# Patient Record
Sex: FEMALE | Race: WHITE | Employment: FULL TIME | URBAN - METROPOLITAN AREA
[De-identification: names, ages, dates, MRNs, and addresses within clinical notes are randomized per-mention and may not be internally consistent; named-entity substitution may affect disease eponyms.]

---

## 2017-06-23 ENCOUNTER — HOSPITAL ENCOUNTER (OUTPATIENT)
Dept: MAMMOGRAPHY | Age: 62
Discharge: HOME | End: 2017-06-23
Attending: INTERNAL MEDICINE
Payer: COMMERCIAL

## 2017-06-23 DIAGNOSIS — Z12.31 VISIT FOR SCREENING MAMMOGRAM: ICD-10-CM

## 2017-06-23 PROCEDURE — 77067 SCR MAMMO BI INCL CAD: CPT

## 2017-09-08 ENCOUNTER — HOSPITAL ENCOUNTER (OUTPATIENT)
Dept: CARDIAC REHAB | Age: 62
Discharge: HOME | End: 2017-09-08
Payer: COMMERCIAL

## 2017-09-08 VITALS — WEIGHT: 186 LBS | BODY MASS INDEX: 34.23 KG/M2 | HEIGHT: 62 IN

## 2017-09-08 DIAGNOSIS — J44.9 CHRONIC OBSTRUCTIVE PULMONARY DISEASE, UNSPECIFIED COPD TYPE (HCC): ICD-10-CM

## 2017-09-08 PROCEDURE — 93798 PHYS/QHP OP CAR RHAB W/ECG: CPT

## 2017-09-08 PROCEDURE — G0424 PULMONARY REHAB W EXER: HCPCS

## 2017-09-08 RX ORDER — PRIMIDONE 50 MG/1
50 TABLET ORAL 3 TIMES DAILY
COMMUNITY

## 2017-09-08 RX ORDER — ALBUTEROL SULFATE 90 UG/1
1 AEROSOL, METERED RESPIRATORY (INHALATION)
COMMUNITY

## 2017-09-08 RX ORDER — BUDESONIDE AND FORMOTEROL FUMARATE DIHYDRATE 160; 4.5 UG/1; UG/1
2 AEROSOL RESPIRATORY (INHALATION) 2 TIMES DAILY
COMMUNITY

## 2017-09-08 RX ORDER — PROPRANOLOL HYDROCHLORIDE 160 MG/1
160 CAPSULE, EXTENDED RELEASE ORAL DAILY
COMMUNITY

## 2017-09-08 RX ORDER — ESTRADIOL 1 MG/1
1 TABLET ORAL 2 TIMES DAILY
COMMUNITY

## 2017-09-08 NOTE — CARDIO/PULMONARY
Pulmonary Rehab Intake Note  Met with Ms. Josefa Cornejo for her initial session. Ms. Jose Fischer is a 63year old patient of Dr. Mumtaz Cuevas and Dr. Kiara Valencia who presents to rehab for pulmonary conditioning and strengthening. She had a HTN and an essential tremor. She has no known allergies. Her immunizations are up to date for flu and pneumonia. She is a former smoker, having quit in 2008. Psychosocial:  Pt scored a 9 on PHQ9. She denies any depression or anxiety. She has a supportive . She states she has stress on occasion due to son's finances, otherwise denies. She works full time from home for the [employer] to [employer]. Lungs were clear. She denied cough. No BLE edema. Pt currently does no exercise at home. Limitations are primarily related to deconditioning. Her six minute walk was done on room air and she walked 269 meters without stopping. High HR was 100 and low O2 saturation was 90%. RPE 11 and RPD 0. Patient viewed the pulmonary rehab DVD and she was given an education notebook. Resting vital signs were /80 in the right arm and 122/82 in the left arm. Heart monitor showed a NSR. Oxygen saturation on room air was 98%. BMI 34.65. Patient's identified goals are:   1. To complete all 36 sessions of Pulm Rehab.   2. To begin home walking program starting at 5 min 2-3 x daily and increase as tolerated. 3. To continue counting carbs and using portion control for weight loss.

## 2017-09-08 NOTE — CARDIO/PULMONARY
COPD Assessment Tool (CAT)     0-5           I never cough/I cough all the time       Score: 1      I have no phlegm in my chest/ My chest is completely full of phlegm  Score: 1      My chest does not feel tight at all/ My chest feels very tight   Score: 2      When I walk up a hill or stairs I am bot breathless/   When I walk up a hill or stairs I am very breathless    Score: 3      I am not limited doing any activities at home/   I am very limited doing activities at home     Score: 2      I am confident leaving my home despite my lung condition/  I am not at all confident leaving my home because of my lung condition Score: 0      I sleep soundly/ I don't sleep because of my lung condition   Score: 2      I have lots of energy/ I have no energy at all     Score: 3                   Total: 14

## 2017-09-12 ENCOUNTER — HOSPITAL ENCOUNTER (OUTPATIENT)
Dept: CARDIAC REHAB | Age: 62
Discharge: HOME | End: 2017-09-12
Payer: COMMERCIAL

## 2017-09-12 VITALS — WEIGHT: 188 LBS | BODY MASS INDEX: 34.95 KG/M2

## 2017-09-12 PROCEDURE — G0424 PULMONARY REHAB W EXER: HCPCS

## 2017-09-14 ENCOUNTER — HOSPITAL ENCOUNTER (OUTPATIENT)
Dept: CARDIAC REHAB | Age: 62
Discharge: HOME | End: 2017-09-14
Payer: COMMERCIAL

## 2017-09-14 VITALS — WEIGHT: 187.6 LBS | BODY MASS INDEX: 34.87 KG/M2

## 2017-09-14 PROCEDURE — G0424 PULMONARY REHAB W EXER: HCPCS

## 2017-09-19 ENCOUNTER — HOSPITAL ENCOUNTER (OUTPATIENT)
Dept: CARDIAC REHAB | Age: 62
Discharge: HOME | End: 2017-09-19
Payer: COMMERCIAL

## 2017-09-19 VITALS — WEIGHT: 191.2 LBS | BODY MASS INDEX: 35.54 KG/M2

## 2017-09-19 PROCEDURE — G0424 PULMONARY REHAB W EXER: HCPCS

## 2017-09-21 ENCOUNTER — HOSPITAL ENCOUNTER (OUTPATIENT)
Dept: CARDIAC REHAB | Age: 62
Discharge: HOME | End: 2017-09-21
Payer: COMMERCIAL

## 2017-09-21 VITALS — BODY MASS INDEX: 35.47 KG/M2 | WEIGHT: 190.8 LBS

## 2017-09-21 PROCEDURE — G0424 PULMONARY REHAB W EXER: HCPCS

## 2017-09-26 ENCOUNTER — HOSPITAL ENCOUNTER (OUTPATIENT)
Dept: CARDIAC REHAB | Age: 62
Discharge: HOME | End: 2017-09-26
Payer: COMMERCIAL

## 2017-09-26 VITALS — WEIGHT: 188 LBS | BODY MASS INDEX: 34.95 KG/M2

## 2017-09-26 PROCEDURE — G0424 PULMONARY REHAB W EXER: HCPCS

## 2017-09-28 ENCOUNTER — HOSPITAL ENCOUNTER (OUTPATIENT)
Dept: CARDIAC REHAB | Age: 62
Discharge: HOME | End: 2017-09-28
Payer: COMMERCIAL

## 2017-09-28 VITALS — WEIGHT: 188.8 LBS | BODY MASS INDEX: 35.1 KG/M2

## 2017-09-28 PROCEDURE — G0424 PULMONARY REHAB W EXER: HCPCS

## 2017-10-03 ENCOUNTER — HOSPITAL ENCOUNTER (OUTPATIENT)
Dept: CARDIAC REHAB | Age: 62
Discharge: HOME | End: 2017-10-03
Payer: COMMERCIAL

## 2017-10-03 VITALS — BODY MASS INDEX: 35.32 KG/M2 | WEIGHT: 190 LBS

## 2017-10-03 PROCEDURE — G0424 PULMONARY REHAB W EXER: HCPCS

## 2017-10-05 ENCOUNTER — HOSPITAL ENCOUNTER (OUTPATIENT)
Dept: CARDIAC REHAB | Age: 62
Discharge: HOME | End: 2017-10-05
Payer: COMMERCIAL

## 2017-10-05 VITALS — BODY MASS INDEX: 35.3 KG/M2 | WEIGHT: 189.9 LBS

## 2017-10-05 PROCEDURE — G0424 PULMONARY REHAB W EXER: HCPCS

## 2017-10-10 ENCOUNTER — HOSPITAL ENCOUNTER (OUTPATIENT)
Dept: CARDIAC REHAB | Age: 62
Discharge: HOME | End: 2017-10-10
Payer: COMMERCIAL

## 2017-10-10 VITALS — BODY MASS INDEX: 35.1 KG/M2 | WEIGHT: 188.8 LBS

## 2017-10-10 PROCEDURE — G0424 PULMONARY REHAB W EXER: HCPCS

## 2017-10-12 ENCOUNTER — HOSPITAL ENCOUNTER (OUTPATIENT)
Dept: CARDIAC REHAB | Age: 62
Discharge: HOME | End: 2017-10-12
Payer: COMMERCIAL

## 2017-10-12 VITALS — WEIGHT: 189 LBS | BODY MASS INDEX: 35.13 KG/M2

## 2017-10-12 PROCEDURE — G0424 PULMONARY REHAB W EXER: HCPCS

## 2017-10-17 ENCOUNTER — HOSPITAL ENCOUNTER (OUTPATIENT)
Dept: CARDIAC REHAB | Age: 62
Discharge: HOME | End: 2017-10-17
Payer: COMMERCIAL

## 2017-10-17 VITALS — WEIGHT: 190 LBS | BODY MASS INDEX: 35.32 KG/M2

## 2017-10-17 PROCEDURE — G0424 PULMONARY REHAB W EXER: HCPCS

## 2017-10-19 ENCOUNTER — HOSPITAL ENCOUNTER (OUTPATIENT)
Dept: CARDIAC REHAB | Age: 62
Discharge: HOME | End: 2017-10-19
Payer: COMMERCIAL

## 2017-10-19 VITALS — BODY MASS INDEX: 35.32 KG/M2 | WEIGHT: 190 LBS

## 2017-10-19 PROCEDURE — G0424 PULMONARY REHAB W EXER: HCPCS

## 2017-10-24 ENCOUNTER — HOSPITAL ENCOUNTER (OUTPATIENT)
Dept: CARDIAC REHAB | Age: 62
Discharge: HOME | End: 2017-10-24
Payer: COMMERCIAL

## 2017-10-24 VITALS — WEIGHT: 190 LBS | BODY MASS INDEX: 35.32 KG/M2

## 2017-10-24 PROCEDURE — G0424 PULMONARY REHAB W EXER: HCPCS

## 2017-10-26 ENCOUNTER — HOSPITAL ENCOUNTER (OUTPATIENT)
Dept: CARDIAC REHAB | Age: 62
Discharge: HOME | End: 2017-10-26
Payer: COMMERCIAL

## 2017-10-26 VITALS — WEIGHT: 190 LBS | BODY MASS INDEX: 35.32 KG/M2

## 2017-10-26 PROCEDURE — G0424 PULMONARY REHAB W EXER: HCPCS

## 2017-10-31 ENCOUNTER — HOSPITAL ENCOUNTER (OUTPATIENT)
Dept: CARDIAC REHAB | Age: 62
Discharge: HOME | End: 2017-10-31
Payer: COMMERCIAL

## 2017-10-31 VITALS — BODY MASS INDEX: 35.5 KG/M2 | WEIGHT: 191 LBS

## 2017-10-31 PROCEDURE — G0424 PULMONARY REHAB W EXER: HCPCS

## 2017-11-02 ENCOUNTER — HOSPITAL ENCOUNTER (OUTPATIENT)
Dept: CARDIAC REHAB | Age: 62
Discharge: HOME | End: 2017-11-02
Payer: COMMERCIAL

## 2017-11-02 VITALS — WEIGHT: 192 LBS | BODY MASS INDEX: 35.69 KG/M2

## 2017-11-02 PROCEDURE — G0424 PULMONARY REHAB W EXER: HCPCS

## 2017-11-07 ENCOUNTER — HOSPITAL ENCOUNTER (OUTPATIENT)
Dept: CARDIAC REHAB | Age: 62
Discharge: HOME | End: 2017-11-07
Payer: COMMERCIAL

## 2017-11-07 VITALS — WEIGHT: 191 LBS | BODY MASS INDEX: 35.5 KG/M2

## 2017-11-07 PROCEDURE — G0424 PULMONARY REHAB W EXER: HCPCS

## 2017-11-09 ENCOUNTER — HOSPITAL ENCOUNTER (OUTPATIENT)
Dept: CARDIAC REHAB | Age: 62
Discharge: HOME | End: 2017-11-09
Payer: COMMERCIAL

## 2017-11-09 VITALS — BODY MASS INDEX: 35.88 KG/M2 | WEIGHT: 193 LBS

## 2017-11-09 PROCEDURE — G0424 PULMONARY REHAB W EXER: HCPCS

## 2017-11-14 ENCOUNTER — HOSPITAL ENCOUNTER (OUTPATIENT)
Dept: CARDIAC REHAB | Age: 62
Discharge: HOME | End: 2017-11-14
Payer: COMMERCIAL

## 2017-11-14 VITALS — BODY MASS INDEX: 36.06 KG/M2 | WEIGHT: 194 LBS

## 2017-11-14 PROCEDURE — G0424 PULMONARY REHAB W EXER: HCPCS

## 2017-11-16 ENCOUNTER — HOSPITAL ENCOUNTER (OUTPATIENT)
Dept: CARDIAC REHAB | Age: 62
Discharge: HOME | End: 2017-11-16
Payer: COMMERCIAL

## 2017-11-16 VITALS — BODY MASS INDEX: 36.06 KG/M2 | WEIGHT: 194 LBS

## 2017-11-16 PROCEDURE — G0424 PULMONARY REHAB W EXER: HCPCS

## 2017-11-21 ENCOUNTER — HOSPITAL ENCOUNTER (OUTPATIENT)
Dept: CARDIAC REHAB | Age: 62
Discharge: HOME | End: 2017-11-21
Payer: COMMERCIAL

## 2017-11-21 VITALS — BODY MASS INDEX: 36.06 KG/M2 | WEIGHT: 194 LBS

## 2017-11-21 PROCEDURE — G0424 PULMONARY REHAB W EXER: HCPCS

## 2017-11-28 ENCOUNTER — HOSPITAL ENCOUNTER (OUTPATIENT)
Dept: CARDIAC REHAB | Age: 62
Discharge: HOME | End: 2017-11-28
Payer: COMMERCIAL

## 2017-11-28 VITALS — WEIGHT: 194 LBS | BODY MASS INDEX: 36.06 KG/M2

## 2017-11-28 PROCEDURE — G0424 PULMONARY REHAB W EXER: HCPCS

## 2017-11-30 ENCOUNTER — HOSPITAL ENCOUNTER (OUTPATIENT)
Dept: CARDIAC REHAB | Age: 62
Discharge: HOME | End: 2017-11-30
Payer: COMMERCIAL

## 2017-11-30 VITALS — BODY MASS INDEX: 36.06 KG/M2 | WEIGHT: 194 LBS

## 2017-11-30 PROCEDURE — G0424 PULMONARY REHAB W EXER: HCPCS

## 2017-12-05 ENCOUNTER — HOSPITAL ENCOUNTER (OUTPATIENT)
Dept: CARDIAC REHAB | Age: 62
Discharge: HOME | End: 2017-12-05
Payer: COMMERCIAL

## 2017-12-05 VITALS — WEIGHT: 194 LBS | BODY MASS INDEX: 36.06 KG/M2

## 2017-12-05 PROCEDURE — G0424 PULMONARY REHAB W EXER: HCPCS

## 2017-12-07 ENCOUNTER — HOSPITAL ENCOUNTER (OUTPATIENT)
Dept: CARDIAC REHAB | Age: 62
Discharge: HOME | End: 2017-12-07
Payer: COMMERCIAL

## 2017-12-07 VITALS — BODY MASS INDEX: 35.88 KG/M2 | WEIGHT: 193 LBS

## 2017-12-07 PROCEDURE — G0424 PULMONARY REHAB W EXER: HCPCS

## 2017-12-19 ENCOUNTER — HOSPITAL ENCOUNTER (OUTPATIENT)
Dept: CARDIAC REHAB | Age: 62
Discharge: HOME | End: 2017-12-19
Payer: COMMERCIAL

## 2017-12-19 VITALS — BODY MASS INDEX: 35.69 KG/M2 | WEIGHT: 192 LBS

## 2017-12-19 PROCEDURE — 93798 PHYS/QHP OP CAR RHAB W/ECG: CPT

## 2017-12-19 PROCEDURE — G0424 PULMONARY REHAB W EXER: HCPCS

## 2017-12-21 ENCOUNTER — HOSPITAL ENCOUNTER (OUTPATIENT)
Dept: CARDIAC REHAB | Age: 62
Discharge: HOME | End: 2017-12-21
Payer: COMMERCIAL

## 2017-12-21 VITALS — BODY MASS INDEX: 35.88 KG/M2 | WEIGHT: 193 LBS

## 2017-12-21 PROCEDURE — G0424 PULMONARY REHAB W EXER: HCPCS

## 2017-12-26 ENCOUNTER — HOSPITAL ENCOUNTER (OUTPATIENT)
Dept: CARDIAC REHAB | Age: 62
Discharge: HOME | End: 2017-12-26
Payer: COMMERCIAL

## 2017-12-26 VITALS — WEIGHT: 193 LBS | BODY MASS INDEX: 35.88 KG/M2

## 2017-12-26 PROCEDURE — G0424 PULMONARY REHAB W EXER: HCPCS

## 2017-12-28 ENCOUNTER — HOSPITAL ENCOUNTER (OUTPATIENT)
Dept: CARDIAC REHAB | Age: 62
Discharge: HOME | End: 2017-12-28
Payer: COMMERCIAL

## 2017-12-28 VITALS — WEIGHT: 193 LBS | BODY MASS INDEX: 35.88 KG/M2

## 2017-12-28 PROCEDURE — G0424 PULMONARY REHAB W EXER: HCPCS

## 2018-01-02 ENCOUNTER — HOSPITAL ENCOUNTER (OUTPATIENT)
Dept: CARDIAC REHAB | Age: 63
Discharge: HOME | End: 2018-01-02
Payer: COMMERCIAL

## 2018-01-02 VITALS — WEIGHT: 191 LBS | BODY MASS INDEX: 35.5 KG/M2

## 2018-01-02 PROCEDURE — G0424 PULMONARY REHAB W EXER: HCPCS

## 2018-01-09 ENCOUNTER — HOSPITAL ENCOUNTER (OUTPATIENT)
Dept: CARDIAC REHAB | Age: 63
Discharge: HOME | End: 2018-01-09
Payer: COMMERCIAL

## 2018-01-09 VITALS — WEIGHT: 194 LBS | BODY MASS INDEX: 36.06 KG/M2

## 2018-01-09 PROCEDURE — G0424 PULMONARY REHAB W EXER: HCPCS

## 2018-01-11 ENCOUNTER — HOSPITAL ENCOUNTER (OUTPATIENT)
Dept: CARDIAC REHAB | Age: 63
Discharge: HOME | End: 2018-01-11
Payer: COMMERCIAL

## 2018-01-11 VITALS — WEIGHT: 193 LBS | BODY MASS INDEX: 35.88 KG/M2

## 2018-01-11 PROCEDURE — G0424 PULMONARY REHAB W EXER: HCPCS

## 2018-01-16 ENCOUNTER — HOSPITAL ENCOUNTER (OUTPATIENT)
Dept: CARDIAC REHAB | Age: 63
Discharge: HOME | End: 2018-01-16
Payer: COMMERCIAL

## 2018-01-16 VITALS — BODY MASS INDEX: 35.69 KG/M2 | WEIGHT: 192 LBS

## 2018-01-16 PROCEDURE — G0424 PULMONARY REHAB W EXER: HCPCS

## 2018-01-18 ENCOUNTER — HOSPITAL ENCOUNTER (OUTPATIENT)
Dept: CARDIAC REHAB | Age: 63
Discharge: HOME | End: 2018-01-18
Payer: COMMERCIAL

## 2018-01-18 VITALS — BODY MASS INDEX: 35.88 KG/M2 | WEIGHT: 193 LBS

## 2018-01-18 PROCEDURE — G0424 PULMONARY REHAB W EXER: HCPCS

## 2018-01-23 ENCOUNTER — HOSPITAL ENCOUNTER (OUTPATIENT)
Dept: CARDIAC REHAB | Age: 63
End: 2018-01-23
Payer: COMMERCIAL

## 2018-01-23 ENCOUNTER — HOSPITAL ENCOUNTER (OUTPATIENT)
Dept: CARDIAC REHAB | Age: 63
Discharge: HOME | End: 2018-01-23
Payer: COMMERCIAL

## 2018-01-23 VITALS — WEIGHT: 194 LBS | BODY MASS INDEX: 36.06 KG/M2

## 2018-01-23 PROCEDURE — G0424 PULMONARY REHAB W EXER: HCPCS

## 2018-01-23 NOTE — CARDIO/PULMONARY
Ms. Diana Garber  has completed Pulmonary Rehab and has decided to continue with a self-directed exercise program at home. Pt made good progress and completed 36/36 visits. Her  pre goals were as follows:      Patient's identified goals are:   1. To complete all 36 sessions of Pulm Rehab.   2. To begin home walking program starting at 5 min 2-3 x daily and increase as tolerated. 3. To continue counting carbs and using portion control for weight loss.           Pt met goals 1 and 2, patient will continue to work on weight loss, goal #3. Pt increase 6 min walk from 269 meters to 470 meters, pt improved her nutrition score and CAT and PHQ9. Pt's medications were reviewed and update, patient has had her flu shot. Ms Acevedo Grady to continue to work on increasing fruits and vegetables and losing weight.

## 2018-01-25 ENCOUNTER — APPOINTMENT (OUTPATIENT)
Dept: CARDIAC REHAB | Age: 63
End: 2018-01-25
Payer: COMMERCIAL

## 2018-01-29 ENCOUNTER — HOSPITAL ENCOUNTER (OUTPATIENT)
Dept: MAMMOGRAPHY | Age: 63
Discharge: HOME | End: 2018-01-29
Attending: OBSTETRICS & GYNECOLOGY
Payer: COMMERCIAL

## 2018-01-29 ENCOUNTER — HOSPITAL ENCOUNTER (OUTPATIENT)
Dept: ULTRASOUND IMAGING | Age: 63
Discharge: HOME | End: 2018-01-29
Attending: OBSTETRICS & GYNECOLOGY
Payer: COMMERCIAL

## 2018-01-29 DIAGNOSIS — N63.10 LUMP OF RIGHT BREAST: ICD-10-CM

## 2018-01-29 DIAGNOSIS — N64.4 BREAST TENDERNESS: ICD-10-CM

## 2018-01-29 PROCEDURE — 76642 ULTRASOUND BREAST LIMITED: CPT

## 2018-01-29 PROCEDURE — 77065 DX MAMMO INCL CAD UNI: CPT

## 2018-01-30 ENCOUNTER — APPOINTMENT (OUTPATIENT)
Dept: CARDIAC REHAB | Age: 63
End: 2018-01-30
Payer: COMMERCIAL

## 2018-02-01 ENCOUNTER — APPOINTMENT (OUTPATIENT)
Dept: CARDIAC REHAB | Age: 63
End: 2018-02-01

## 2018-07-03 ENCOUNTER — HOSPITAL ENCOUNTER (OUTPATIENT)
Dept: MAMMOGRAPHY | Age: 63
Discharge: HOME | End: 2018-07-03
Attending: OBSTETRICS & GYNECOLOGY
Payer: COMMERCIAL

## 2018-07-03 DIAGNOSIS — Z12.31 VISIT FOR SCREENING MAMMOGRAM: ICD-10-CM

## 2018-07-03 PROCEDURE — 77067 SCR MAMMO BI INCL CAD: CPT

## 2018-08-14 ENCOUNTER — HOSPITAL ENCOUNTER (OUTPATIENT)
Dept: GENERAL RADIOLOGY | Age: 63
Discharge: HOME | End: 2018-08-14
Payer: COMMERCIAL

## 2018-08-14 DIAGNOSIS — R06.02 SHORTNESS OF BREATH: ICD-10-CM

## 2018-08-14 PROCEDURE — 71046 X-RAY EXAM CHEST 2 VIEWS: CPT

## 2018-10-30 ENCOUNTER — HOSPITAL ENCOUNTER (OUTPATIENT)
Dept: GENERAL RADIOLOGY | Age: 63
Discharge: HOME | End: 2018-10-30
Payer: COMMERCIAL

## 2018-10-30 DIAGNOSIS — R05.9 COUGH: ICD-10-CM

## 2018-10-30 PROCEDURE — 71046 X-RAY EXAM CHEST 2 VIEWS: CPT

## 2019-02-05 ENCOUNTER — HOSPITAL ENCOUNTER (OUTPATIENT)
Dept: MRI IMAGING | Age: 64
Discharge: HOME | End: 2019-02-05
Attending: GENERAL PRACTICE
Payer: COMMERCIAL

## 2019-02-05 DIAGNOSIS — S43.431A SUPERIOR GLENOID LABRUM LESION OF RIGHT SHOULDER: ICD-10-CM

## 2019-02-05 DIAGNOSIS — M75.41 IMPINGEMENT SYNDROME OF SHOULDER, RIGHT: ICD-10-CM

## 2019-02-05 DIAGNOSIS — M25.511 RIGHT SHOULDER PAIN: ICD-10-CM

## 2019-02-05 DIAGNOSIS — M75.121 COMPLETE TEAR OF RIGHT ROTATOR CUFF: ICD-10-CM

## 2019-02-05 PROCEDURE — 73221 MRI JOINT UPR EXTREM W/O DYE: CPT

## 2019-10-31 ENCOUNTER — HOSPITAL ENCOUNTER (OUTPATIENT)
Dept: MAMMOGRAPHY | Age: 64
Discharge: HOME | End: 2019-10-31
Attending: OBSTETRICS & GYNECOLOGY
Payer: COMMERCIAL

## 2019-10-31 DIAGNOSIS — Z12.31 VISIT FOR SCREENING MAMMOGRAM: ICD-10-CM

## 2019-10-31 PROCEDURE — 77067 SCR MAMMO BI INCL CAD: CPT

## 2019-12-13 ENCOUNTER — HOSPITAL ENCOUNTER (OUTPATIENT)
Dept: ULTRASOUND IMAGING | Age: 64
Discharge: HOME | End: 2019-12-13
Attending: INTERNAL MEDICINE
Payer: COMMERCIAL

## 2019-12-13 DIAGNOSIS — M79.89 SOFT TISSUE MASS: ICD-10-CM

## 2019-12-13 PROCEDURE — 76604 US EXAM CHEST: CPT

## 2020-10-12 ENCOUNTER — TRANSCRIBE ORDER (OUTPATIENT)
Dept: SCHEDULING | Age: 65
End: 2020-10-12

## 2020-10-12 DIAGNOSIS — Z12.31 VISIT FOR SCREENING MAMMOGRAM: Primary | ICD-10-CM

## 2020-11-03 ENCOUNTER — HOSPITAL ENCOUNTER (OUTPATIENT)
Dept: MAMMOGRAPHY | Age: 65
Discharge: HOME | End: 2020-11-03
Attending: INTERNAL MEDICINE
Payer: COMMERCIAL

## 2020-11-03 DIAGNOSIS — Z12.31 VISIT FOR SCREENING MAMMOGRAM: ICD-10-CM

## 2020-11-03 PROCEDURE — 77063 BREAST TOMOSYNTHESIS BI: CPT

## 2021-04-27 ENCOUNTER — TRANSCRIBE ORDER (OUTPATIENT)
Dept: SCHEDULING | Age: 66
End: 2021-04-27

## 2021-04-27 DIAGNOSIS — Z87.891 PERSONAL HISTORY OF NICOTINE DEPENDENCE: Primary | ICD-10-CM

## 2021-10-20 ENCOUNTER — TRANSCRIBE ORDER (OUTPATIENT)
Dept: SCHEDULING | Age: 66
End: 2021-10-20

## 2021-10-20 DIAGNOSIS — Z12.31 SCREENING MAMMOGRAM FOR HIGH-RISK PATIENT: Primary | ICD-10-CM

## 2021-11-24 ENCOUNTER — HOSPITAL ENCOUNTER (OUTPATIENT)
Dept: MAMMOGRAPHY | Age: 66
Discharge: HOME | End: 2021-11-24
Attending: INTERNAL MEDICINE
Payer: COMMERCIAL

## 2021-11-24 DIAGNOSIS — Z12.31 SCREENING MAMMOGRAM FOR HIGH-RISK PATIENT: ICD-10-CM

## 2021-11-24 PROCEDURE — 77063 BREAST TOMOSYNTHESIS BI: CPT

## 2022-10-31 ENCOUNTER — TRANSCRIBE ORDER (OUTPATIENT)
Dept: SCHEDULING | Age: 67
End: 2022-10-31

## 2022-10-31 DIAGNOSIS — Z12.31 SCREENING MAMMOGRAM FOR HIGH-RISK PATIENT: Primary | ICD-10-CM

## 2022-12-09 ENCOUNTER — HOSPITAL ENCOUNTER (OUTPATIENT)
Dept: MAMMOGRAPHY | Age: 67
Discharge: HOME | End: 2022-12-09
Attending: INTERNAL MEDICINE
Payer: COMMERCIAL

## 2022-12-09 DIAGNOSIS — Z12.31 SCREENING MAMMOGRAM FOR HIGH-RISK PATIENT: ICD-10-CM

## 2022-12-09 PROCEDURE — 77063 BREAST TOMOSYNTHESIS BI: CPT

## 2023-07-07 ENCOUNTER — HOSPITAL ENCOUNTER (OUTPATIENT)
Facility: HOSPITAL | Age: 68
Discharge: HOME | End: 2023-07-07
Attending: OTOLARYNGOLOGY
Payer: COMMERCIAL

## 2023-07-07 DIAGNOSIS — R22.0 LOCALIZED SWELLING, MASS AND LUMP, HEAD: ICD-10-CM

## 2023-07-07 LAB — CREAT BLD-MCNC: 1 MG/DL (ref 0.6–1.3)

## 2023-07-07 PROCEDURE — 82565 ASSAY OF CREATININE: CPT

## 2023-07-07 PROCEDURE — 70491 CT SOFT TISSUE NECK W/DYE: CPT

## 2023-07-07 PROCEDURE — 6360000004 HC RX CONTRAST MEDICATION: Performed by: OTOLARYNGOLOGY

## 2023-07-07 RX ADMIN — IOPAMIDOL 100 ML: 755 INJECTION, SOLUTION INTRAVENOUS at 15:29

## 2023-08-01 ENCOUNTER — HOSPITAL ENCOUNTER (OUTPATIENT)
Facility: HOSPITAL | Age: 68
Discharge: HOME | End: 2023-08-04
Attending: OTOLARYNGOLOGY
Payer: COMMERCIAL

## 2023-08-01 DIAGNOSIS — R22.0 LOCALIZED SWELLING, MASS AND LUMP, HEAD: ICD-10-CM

## 2023-08-01 PROCEDURE — 87206 SMEAR FLUORESCENT/ACID STAI: CPT

## 2023-08-01 PROCEDURE — 2709999900 US BIOPSY LYMPH NODE

## 2023-08-01 PROCEDURE — 88173 CYTOPATH EVAL FNA REPORT: CPT

## 2023-08-01 PROCEDURE — 87070 CULTURE OTHR SPECIMN AEROBIC: CPT

## 2023-08-01 PROCEDURE — 88172 CYTP DX EVAL FNA 1ST EA SITE: CPT

## 2023-08-01 PROCEDURE — 88305 TISSUE EXAM BY PATHOLOGIST: CPT

## 2023-08-01 PROCEDURE — 87116 MYCOBACTERIA CULTURE: CPT

## 2023-08-01 PROCEDURE — 87102 FUNGUS ISOLATION CULTURE: CPT

## 2023-08-01 PROCEDURE — 2500000003 HC RX 250 WO HCPCS: Performed by: OTOLARYNGOLOGY

## 2023-08-01 PROCEDURE — 87205 SMEAR GRAM STAIN: CPT

## 2023-08-01 RX ORDER — LIDOCAINE HYDROCHLORIDE 10 MG/ML
10 INJECTION, SOLUTION EPIDURAL; INFILTRATION; INTRACAUDAL; PERINEURAL ONCE
Status: COMPLETED | OUTPATIENT
Start: 2023-08-01 | End: 2023-08-01

## 2023-08-01 RX ADMIN — LIDOCAINE HYDROCHLORIDE 10 ML: 10 INJECTION, SOLUTION EPIDURAL; INFILTRATION; INTRACAUDAL; PERINEURAL at 14:40

## 2023-08-04 LAB
ACID FAST STN SPEC: NEGATIVE
MYCOBACTERIUM SPEC QL CULT: NORMAL
SPECIMEN PREPARATION: NORMAL
SPECIMEN SOURCE: NORMAL

## 2023-08-05 LAB
BACTERIA SPEC CULT: NORMAL
BACTERIA SPEC CULT: NORMAL
GRAM STN SPEC: NORMAL
GRAM STN SPEC: NORMAL
SERVICE CMNT-IMP: NORMAL

## 2023-08-07 LAB
BACTERIA SPEC CULT: NORMAL
SERVICE CMNT-IMP: NORMAL

## 2023-08-14 LAB
BACTERIA SPEC CULT: NORMAL
SERVICE CMNT-IMP: NORMAL

## 2023-08-21 LAB
BACTERIA SPEC CULT: NORMAL
SERVICE CMNT-IMP: NORMAL

## 2023-08-28 LAB
BACTERIA SPEC CULT: NORMAL
SERVICE CMNT-IMP: NORMAL

## 2023-09-05 LAB
BACTERIA SPEC CULT: NORMAL
SERVICE CMNT-IMP: NORMAL

## 2023-09-15 LAB
ACID FAST STN SPEC: NEGATIVE
MYCOBACTERIUM SPEC QL CULT: NEGATIVE
SPECIMEN PREPARATION: NORMAL
SPECIMEN SOURCE: NORMAL